# Patient Record
Sex: FEMALE | Race: OTHER | Employment: STUDENT | URBAN - METROPOLITAN AREA
[De-identification: names, ages, dates, MRNs, and addresses within clinical notes are randomized per-mention and may not be internally consistent; named-entity substitution may affect disease eponyms.]

---
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
## 2019-04-20 ENCOUNTER — TELEPHONE (OUTPATIENT)
Dept: PEDIATRICS CLINIC | Facility: CLINIC | Age: 19
End: 2019-04-20

## 2019-04-20 NOTE — TELEPHONE ENCOUNTER
Patient is 18 and no BETY on file  Patient needs to call to request forms  [facility]

## 2019-04-27 ENCOUNTER — TELEPHONE (OUTPATIENT)
Dept: PEDIATRICS CLINIC | Facility: CLINIC | Age: 19
End: 2019-04-27

## 2019-04-27 NOTE — TELEPHONE ENCOUNTER
Forms placed on [facility] desk at [facility] for review and completion. Last [address] [place] [address] 8/20/18. Immunization section asking if patient up to date- Health maint says needs 2nd varivax. 1st was given prior to first birthday. Need nurse visit?

## 2019-04-27 NOTE — TELEPHONE ENCOUNTER
PT DROPPED OFF FORMS TO BE FILLED OUT FOR A SUMMER JOB, FORMS ARE IN GREEN BIN AT . PLS CALL PT WHEN ALL DONE AND READY TO BE PICKED UP.

## 2019-04-29 NOTE — TELEPHONE ENCOUNTER
Attempted to call mom, no answer and unable to leave voicemail  Form at nurse station at [facility]

## 2019-04-30 NOTE — TELEPHONE ENCOUNTER
Spoke with mom, aware paperwork is ready and TB is still incomplete,as well as need for varicella vaccine. Mom states she will  forms and meera [facility] will complete TB and chickenpox vaccine at [facility].

## 2019-05-06 ENCOUNTER — TELEPHONE (OUTPATIENT)
Dept: PEDIATRICS CLINIC | Facility: CLINIC | Age: 19
End: 2019-05-06

## 2019-05-06 NOTE — TELEPHONE ENCOUNTER
Patient needs TB test and Varicella vaccine   Had 1st varicella before her 1st birthday    Nurse visit scheduled for 5/25 for TB placement and Varicella  TB read scheduled for 5/28    Orders pended and routed to [facility]

## 2019-05-25 ENCOUNTER — NURSE ONLY (OUTPATIENT)
Dept: PEDIATRICS CLINIC | Facility: CLINIC | Age: 19
End: 2019-05-25
Payer: COMMERCIAL

## 2019-05-25 DIAGNOSIS — Z23 NEED FOR VACCINATION: Primary | ICD-10-CM

## 2019-05-25 PROCEDURE — 90716 VAR VACCINE LIVE SUBQ: CPT | Performed by: PEDIATRICS

## 2019-05-25 PROCEDURE — 86580 TB INTRADERMAL TEST: CPT | Performed by: PEDIATRICS

## 2019-05-25 PROCEDURE — 90471 IMMUNIZATION ADMIN: CPT | Performed by: PEDIATRICS

## 2019-05-25 NOTE — PROGRESS NOTES
Pt here to day for varicella and tb test per . Last wcc 8/20/18 with . TB placed on L fore arm pt instructed to return to office 5/28/19 before 12pm to have tb read.

## 2019-05-28 ENCOUNTER — NURSE ONLY (OUTPATIENT)
Dept: PEDIATRICS CLINIC | Facility: CLINIC | Age: 19
End: 2019-05-28

## 2019-05-28 DIAGNOSIS — Z11.1 SCREENING FOR TUBERCULOSIS: Primary | ICD-10-CM

## 2019-05-28 NOTE — PROGRESS NOTES
Patient came in today for nurse visit to have TB test read  Reading negative  Immunization record and result letter given

## 2019-06-22 NOTE — PATIENT INSTRUCTIONS
Healthy Active Living  An initiative of the American Academy of Pediatrics    Fact Sheet: Healthy Active Living for Families    Healthy nutrition starts as early as infancy with breastfeeding.  Once your baby begins eating solid foods, introduce nutritiou Stay involved in your teen’s life. Make sure your teen knows you’re always there when he or she needs to talk. During the teen years, it’s important to keep having yearly checkups. Your teen may be embarrassed about having a checkup.  Reassure your teen t · Body changes. The body grows and matures during puberty. Hair will grow in the pubic area and on other parts of the body. Girls grow breasts and menstruate (have monthly periods). A boy’s voice changes, becoming lower and deeper.  As the penis matures, er · Eat healthy. Your child should eat fruits, vegetables, lean meats, and whole grains every day. Less healthy foods—like french fries, candy, and chips—should be eaten rarely.  Some teens fall into the trap of snacking on junk food and fast food throughout · Encourage your teen to keep a consistent bedtime, even on weekends. Sleeping is easier when the body follows a routine. Don’t let your teen stay up too late at night or sleep in too long in the morning. · Help your teen wake up, if needed.  Go into the b · Set rules and limits around driving and use of the car. If your teen gets a ticket or has an accident, there should be consequences. Driving is a privilege that can be taken away if your child doesn’t follow the rules.   · Teach your child to make good de © 5610-9116 [address]. 1407 [facility], [employer] [place] [employer]. All rights reserved. This information is not intended as a substitute for professional medical care. Always follow your healthcare professional's instructions. [employer], [facility]

## 2020-12-07 NOTE — H&P
Kathia Carvajal is a 23year old female. HPI:   Patient presents with:  Checkup: pt here for school check up due to studying abroad     Ms. Chaparro is a 23year old female coming in for annual physical examination and for study abroad in February to [place] Allergies:    Peanuts                 ANAPHYLAXIS  Shellfish-Derived P*    UNKNOWN      ROS:   Positive Findings indicated in BOLD    Constitutional: Fever, Chills, Weight Gain, Weight Loss, Night Sweats, Fatigue, Malaise  ENT/Mouth:  Hearing Changes rebound tenderness.  No hepatomegaly, No splenomegaly  Genitourinary: No CVA tenderness bilaterally  Neurologic: No focal neurological deficits, CN II-XII intact, light touch intact, MSK Strength 5/5 and symmetric in all extremities, normal gait  Musculoske Rubella, IGG [E]      Mumps virus AB, IGG & IGM [E]      Rubeola(Measles)Antibodies, IGG-Immunity      Rubeola(Measles)Antibodies,IGM      Meds This Visit:  Requested Prescriptions      No prescriptions requested or ordered in this encounter       Imaging

## 2020-12-07 NOTE — PATIENT INSTRUCTIONS
- You were seen in clinic for regular annual check-up. We have ordered labs for you and we will call you with the results. We will update your study abroad forms as the results come in.   We will call you when it is ready for pickup to continue processing

## 2020-12-09 ENCOUNTER — LAB ENCOUNTER (OUTPATIENT)
Dept: LAB | Age: 20
End: 2020-12-09
Attending: INTERNAL MEDICINE
Payer: COMMERCIAL

## 2020-12-09 ENCOUNTER — OFFICE VISIT (OUTPATIENT)
Dept: INTERNAL MEDICINE CLINIC | Facility: CLINIC | Age: 20
End: 2020-12-09
Payer: COMMERCIAL

## 2020-12-09 VITALS
DIASTOLIC BLOOD PRESSURE: 70 MMHG | TEMPERATURE: 98 F | WEIGHT: 137.38 LBS | OXYGEN SATURATION: 98 % | HEIGHT: 62.5 IN | HEART RATE: 99 BPM | SYSTOLIC BLOOD PRESSURE: 126 MMHG | BODY MASS INDEX: 24.65 KG/M2

## 2020-12-09 DIAGNOSIS — Z01.419 WELL WOMAN EXAM WITH ROUTINE GYNECOLOGICAL EXAM: ICD-10-CM

## 2020-12-09 DIAGNOSIS — Z01.84 IMMUNITY TO MUMPS DETERMINED BY SEROLOGIC TEST: ICD-10-CM

## 2020-12-09 DIAGNOSIS — Z01.84 IMMUNITY TO MEASLES, MUMPS, AND RUBELLA DETERMINED BY SEROLOGIC TEST: ICD-10-CM

## 2020-12-09 DIAGNOSIS — Z01.84 IMMUNITY TO MUMPS DETERMINED BY SEROLOGIC TEST: Primary | ICD-10-CM

## 2020-12-09 DIAGNOSIS — Z11.3 SCREENING FOR STD (SEXUALLY TRANSMITTED DISEASE): ICD-10-CM

## 2020-12-09 DIAGNOSIS — Z11.1 SCREENING FOR TUBERCULOSIS: ICD-10-CM

## 2020-12-09 PROCEDURE — 86735 MUMPS ANTIBODY: CPT

## 2020-12-09 PROCEDURE — 3008F BODY MASS INDEX DOCD: CPT | Performed by: INTERNAL MEDICINE

## 2020-12-09 PROCEDURE — 86762 RUBELLA ANTIBODY: CPT | Performed by: INTERNAL MEDICINE

## 2020-12-09 PROCEDURE — 86780 TREPONEMA PALLIDUM: CPT | Performed by: INTERNAL MEDICINE

## 2020-12-09 PROCEDURE — 99385 PREV VISIT NEW AGE 18-39: CPT | Performed by: INTERNAL MEDICINE

## 2020-12-09 PROCEDURE — 86765 RUBEOLA ANTIBODY: CPT | Performed by: INTERNAL MEDICINE

## 2020-12-09 PROCEDURE — 3074F SYST BP LT 130 MM HG: CPT | Performed by: INTERNAL MEDICINE

## 2020-12-09 PROCEDURE — 36415 COLL VENOUS BLD VENIPUNCTURE: CPT

## 2020-12-09 PROCEDURE — 86480 TB TEST CELL IMMUN MEASURE: CPT | Performed by: INTERNAL MEDICINE

## 2020-12-09 PROCEDURE — 3078F DIAST BP <80 MM HG: CPT | Performed by: INTERNAL MEDICINE

## 2020-12-14 ENCOUNTER — TELEPHONE (OUTPATIENT)
Dept: INTERNAL MEDICINE CLINIC | Facility: CLINIC | Age: 20
End: 2020-12-14

## 2020-12-14 NOTE — TELEPHONE ENCOUNTER
LMTCB. Paperwork from MD outbox left in labeled envelope at [facility] for pickup. Copy sent to scanning.

## 2020-12-14 NOTE — TELEPHONE ENCOUNTER
Please kindly notify the patient that I reviewed her blood results from 12/9/2020: (Sorry for lengthy message)    Syphilis screen: Negative  Rubella antibodies: She is immune to rubella with sufficient antibodies  Rubeola/measles: She is immune to measles

## 2020-12-15 ENCOUNTER — TELEPHONE (OUTPATIENT)
Dept: INTERNAL MEDICINE CLINIC | Facility: CLINIC | Age: 20
End: 2020-12-15

## 2020-12-15 DIAGNOSIS — Z91.018 FOOD ALLERGY: ICD-10-CM

## 2020-12-15 DIAGNOSIS — T78.2XXS ANAPHYLAXIS, SEQUELA: ICD-10-CM

## 2020-12-15 NOTE — TELEPHONE ENCOUNTER
Please kindly notify the patient that all of her blood work has resulted:    Rubella, mumps, measles IgM levels were negative. These are not required for the paperwork she picked up.   Undetectable IgM levels means that she is not actively having these dis

## 2020-12-18 ENCOUNTER — TELEPHONE (OUTPATIENT)
Dept: INTERNAL MEDICINE CLINIC | Facility: CLINIC | Age: 20
End: 2020-12-18

## 2020-12-18 DIAGNOSIS — Z20.822 EXPOSURE TO COVID-19 VIRUS: Primary | ICD-10-CM

## 2020-12-18 NOTE — TELEPHONE ENCOUNTER
Two options:  1) pt may seek COVID test at another facility   2) pt may call here on Jan 20-22, and we can pursue appropriate COVID testing at that time    Please call pt

## 2020-12-18 NOTE — TELEPHONE ENCOUNTER
Patient calling for Covid test order. Needs test done in January. Results must be between 1/26-1/28/2021. Will be traveling abroad for school.      Best call back number [phone]

## 2020-12-18 NOTE — TELEPHONE ENCOUNTER
To Dr. Abdirahman Farrar to please advise in PCP absence (FYI Dr. Luis Shoemaker)---  [facility] COVID test ordered per MD protocol for return to school precautions--patient notified and reports that she will be traveling abroad to [facility] [place] who requires a COVID RT-PCR test. [facility] COVID test ca

## 2020-12-23 ENCOUNTER — OFFICE VISIT (OUTPATIENT)
Dept: OBGYN CLINIC | Facility: CLINIC | Age: 20
End: 2020-12-23
Payer: COMMERCIAL

## 2020-12-23 VITALS
BODY MASS INDEX: 25.47 KG/M2 | SYSTOLIC BLOOD PRESSURE: 110 MMHG | DIASTOLIC BLOOD PRESSURE: 78 MMHG | WEIGHT: 138.38 LBS | HEIGHT: 62 IN

## 2020-12-23 DIAGNOSIS — Z01.419 ENCOUNTER FOR GYNECOLOGICAL EXAMINATION WITHOUT ABNORMAL FINDING: Primary | ICD-10-CM

## 2020-12-23 PROCEDURE — 99385 PREV VISIT NEW AGE 18-39: CPT | Performed by: OBSTETRICS & GYNECOLOGY

## 2020-12-23 PROCEDURE — 3078F DIAST BP <80 MM HG: CPT | Performed by: OBSTETRICS & GYNECOLOGY

## 2020-12-23 PROCEDURE — 3074F SYST BP LT 130 MM HG: CPT | Performed by: OBSTETRICS & GYNECOLOGY

## 2020-12-23 PROCEDURE — 3008F BODY MASS INDEX DOCD: CPT | Performed by: OBSTETRICS & GYNECOLOGY

## 2020-12-23 NOTE — PROGRESS NOTES
GYN H&P     2020  9:02 AM    CC: Patient is here for annual. No previous pap    HPI: Patient is a 23year old  for above. Not sexually active ever.      Menses: once a month, some cramps relieved with ibuprofen  Pelvic Pain: None  Vaginal disch Sexual activity: Never        Partners: Male    Social History    Social History Narrative      Live with parents      No h/o abuse      Feels safe. Medications reviewed.  See active list.     /78   Ht 62\"   Wt 138 lb 6.4 oz (62.8 kg)   LMP 12/

## 2021-01-04 RX ORDER — EPINEPHRINE 0.3 MG/.3ML
INJECTION SUBCUTANEOUS
Qty: 1 EACH | Refills: 1 | Status: SHIPPED | OUTPATIENT
Start: 2021-01-04

## 2021-01-04 NOTE — TELEPHONE ENCOUNTER
Called patient and relayed DR. BERMUDEZ message - verbalized understanding.  RX for epi- pen sent to pharmacy

## 2021-08-06 ENCOUNTER — TELEPHONE (OUTPATIENT)
Dept: OBGYN CLINIC | Facility: CLINIC | Age: 21
End: 2021-08-06

## 2021-08-06 ENCOUNTER — OFFICE VISIT (OUTPATIENT)
Dept: OBGYN CLINIC | Facility: CLINIC | Age: 21
End: 2021-08-06
Payer: COMMERCIAL

## 2021-08-06 VITALS
SYSTOLIC BLOOD PRESSURE: 110 MMHG | HEIGHT: 62 IN | WEIGHT: 139 LBS | DIASTOLIC BLOOD PRESSURE: 72 MMHG | BODY MASS INDEX: 25.58 KG/M2

## 2021-08-06 DIAGNOSIS — N94.6 DYSMENORRHEA: Primary | ICD-10-CM

## 2021-08-06 PROCEDURE — 3008F BODY MASS INDEX DOCD: CPT | Performed by: OBSTETRICS & GYNECOLOGY

## 2021-08-06 PROCEDURE — 3074F SYST BP LT 130 MM HG: CPT | Performed by: OBSTETRICS & GYNECOLOGY

## 2021-08-06 PROCEDURE — 3078F DIAST BP <80 MM HG: CPT | Performed by: OBSTETRICS & GYNECOLOGY

## 2021-08-06 PROCEDURE — 99213 OFFICE O/P EST LOW 20 MIN: CPT | Performed by: OBSTETRICS & GYNECOLOGY

## 2021-08-06 RX ORDER — LEVONORGESTREL AND ETHINYL ESTRADIOL 0.1-0.02MG
1 KIT ORAL DAILY
Qty: 84 TABLET | Refills: 4 | Status: SHIPPED | OUTPATIENT
Start: 2021-08-06 | End: 2021-12-20

## 2021-08-06 RX ORDER — ALBUTEROL SULFATE 90 UG/1
2 AEROSOL, METERED RESPIRATORY (INHALATION)
COMMUNITY
Start: 2021-01-19

## 2021-08-06 NOTE — TELEPHONE ENCOUNTER
Called pt and scheduled to see [address] [facility] today. Pt verbalized understanding.         Last visit 12/23/20 [facility]

## 2021-08-06 NOTE — PROGRESS NOTES
GYN H&P     8/6/2021  2:42 PM    CC: Patient is here for severe menstrual cramps. HPI: Patient is a 23year old [place] presents with worsening LA menstrual cramps which were severe today. She tried aleve which helped after a long time. This has happene tobacco: Never Used    Vaping Use      Vaping Use: Never used    Substance and Sexual Activity      Alcohol use: Never        Alcohol/week: 0.0 standard drinks      Drug use: Never      Sexual activity: Never        Partners: Male    Social History    Soci

## 2021-08-06 NOTE — TELEPHONE ENCOUNTER
PT has bad cramps   Took 2 Aleve for pain  She is vomiting and may have thrown them up  PT is calling for help with pain

## 2021-12-19 NOTE — PATIENT INSTRUCTIONS
- You were seen in clinic for regular annual check-up. We have ordered labs for you and we will call you with the results. Please obtain blood work fasting for at least 12 hours, okay to drink water the day of your blood draw.   If you prefer, it is okay t

## 2021-12-19 NOTE — H&P
Franklyn Jefferson is a 24year old female. HPI:   Patient presents with:  Physical: Annual Px    Ms. Chaparro is a 24year old female coming in for annual physical examination. R ankle pain - has been icing it. Onset was < 1 week.  Has been about the same Neg    • Ovarian Cancer Neg    • Colon Cancer Neg       Social History: Social History    Tobacco Use      Smoking status: Never Smoker      Smokeless tobacco: Never Used    Vaping Use      Vaping Use: Never used    Alcohol use: Never      Alcohol/week: 0. Anxiety, Depression, Insomnia, Suicidal Ideation, Homicidal ideation, Memory Changes  Heme/Lymph: Bruising, Bleeding, Lymphadenopathy  Endocrine: Polyuria, Polydipsia, Temperature Intolerance    PHYSICAL EXAM:   Vital Signs:  Blood pressure 130/76, pulse 1 examination    Right ankle sprain  Possible inciting event related to her physical activity/dance. Pain localized to the dorsum of the right foot.   Benign examination  –Recommended RICE therapy: Resting, icing, compression of the area, elevating the legs 07/19/2021   • DTAP 01/07/2000, 03/09/2000, 05/04/2000, 05/05/2001, 10/30/2004   • FLULAVAL 6 months & older 0.5 ml Prefilled syringe (60941) 10/05/2020   • FLUZONE 6 months and older PFS 0.5 ml (41947) 10/13/2014, 10/05/2020   • HEP A 08/22/2011, 10/13/20

## 2021-12-20 ENCOUNTER — OFFICE VISIT (OUTPATIENT)
Dept: INTERNAL MEDICINE CLINIC | Facility: CLINIC | Age: 21
End: 2021-12-20
Payer: COMMERCIAL

## 2021-12-20 VITALS
OXYGEN SATURATION: 98 % | HEIGHT: 62 IN | SYSTOLIC BLOOD PRESSURE: 130 MMHG | TEMPERATURE: 99 F | WEIGHT: 134.19 LBS | BODY MASS INDEX: 24.69 KG/M2 | HEART RATE: 120 BPM | DIASTOLIC BLOOD PRESSURE: 76 MMHG

## 2021-12-20 DIAGNOSIS — N94.6 DYSMENORRHEA: ICD-10-CM

## 2021-12-20 DIAGNOSIS — F41.1 GENERALIZED ANXIETY DISORDER: ICD-10-CM

## 2021-12-20 DIAGNOSIS — Z13.29 SCREENING FOR THYROID DISORDER: ICD-10-CM

## 2021-12-20 DIAGNOSIS — Z91.018 FOOD ALLERGY: ICD-10-CM

## 2021-12-20 DIAGNOSIS — Z00.00 ROUTINE GENERAL MEDICAL EXAMINATION AT A HEALTH CARE FACILITY: Primary | ICD-10-CM

## 2021-12-20 DIAGNOSIS — Z13.220 SCREENING FOR LIPOID DISORDERS: ICD-10-CM

## 2021-12-20 DIAGNOSIS — Z13.0 SCREENING FOR IRON DEFICIENCY ANEMIA: ICD-10-CM

## 2021-12-20 DIAGNOSIS — E55.9 VITAMIN D DEFICIENCY: ICD-10-CM

## 2021-12-20 PROCEDURE — 3078F DIAST BP <80 MM HG: CPT | Performed by: INTERNAL MEDICINE

## 2021-12-20 PROCEDURE — 3075F SYST BP GE 130 - 139MM HG: CPT | Performed by: INTERNAL MEDICINE

## 2021-12-20 PROCEDURE — 3008F BODY MASS INDEX DOCD: CPT | Performed by: INTERNAL MEDICINE

## 2021-12-20 PROCEDURE — 99395 PREV VISIT EST AGE 18-39: CPT | Performed by: INTERNAL MEDICINE

## 2022-06-01 ENCOUNTER — OFFICE VISIT (OUTPATIENT)
Dept: INTERNAL MEDICINE CLINIC | Facility: CLINIC | Age: 22
End: 2022-06-01
Payer: COMMERCIAL

## 2022-06-01 VITALS
BODY MASS INDEX: 24.36 KG/M2 | HEIGHT: 62 IN | SYSTOLIC BLOOD PRESSURE: 99 MMHG | HEART RATE: 92 BPM | WEIGHT: 132.38 LBS | TEMPERATURE: 98 F | DIASTOLIC BLOOD PRESSURE: 63 MMHG | OXYGEN SATURATION: 98 %

## 2022-06-01 DIAGNOSIS — J35.8 TONSILLAR EXUDATE: Primary | ICD-10-CM

## 2022-06-01 LAB
CONTROL LINE PRESENT WITH A CLEAR BACKGROUND (YES/NO): YES YES/NO
KIT LOT #: 2490 NUMERIC
STREP GRP A CUL-SCR: NEGATIVE

## 2022-06-01 PROCEDURE — 3078F DIAST BP <80 MM HG: CPT | Performed by: INTERNAL MEDICINE

## 2022-06-01 PROCEDURE — 87880 STREP A ASSAY W/OPTIC: CPT | Performed by: INTERNAL MEDICINE

## 2022-06-01 PROCEDURE — 99213 OFFICE O/P EST LOW 20 MIN: CPT | Performed by: INTERNAL MEDICINE

## 2022-06-01 PROCEDURE — 3074F SYST BP LT 130 MM HG: CPT | Performed by: INTERNAL MEDICINE

## 2022-06-01 PROCEDURE — 3008F BODY MASS INDEX DOCD: CPT | Performed by: INTERNAL MEDICINE

## (undated) NOTE — LETTER
VACCINE ADMINISTRATION RECORD  PARENT / GUARDIAN APPROVAL  Date: 2018  Vaccine administered to: Karis Clay     : 2000    MRN: [id number]    A copy of the appropriate Centers for Disease Control and Prevention Vaccine Information statement

## (undated) NOTE — LETTER
[address] [employer] Examination       Student's Name  Junior Kenney D verifying above immunization history must sign below.   Signature                                                                                                                                   Title                           Date     Signature Katerina Chaparro Offer Birth Date  11/1/2000  Sex  Female School   Grade Level/ID#  {DMG_GRADE:1366}   HEALTH HISTORY          TO BE COMPLETED AND SIGNED BY PARENT/GUARDIAN AND VERIFIED BY HEALTH CARE PROVIDER    ALLERGIES  (Food, drug, insect, other)  Peanuts; Bone/Joint problem/injury/scoliosis?    Yes   No  Parent/Guardian Signature                                          Date     PHYSICAL EXAMINATION REQUIREMENTS    Entire section below to be completed by MD/DO/APN/PA       PHYSICAL EXAMINATION REQUIREMENTS ( SYSTEM REVIEW Normal Comments/Follow-up/Needs  Normal Comments/Follow-up/Needs   Skin {YES:829::\"Yes\"}  Endocrine {YES:829::\"Yes\"}    Ears {YES:829::\"Yes\"}                      Screen result: Gastrointestinal {YES:829::\"Yes\"}    Eyes {YES:829::\"Ye SPORTS   {BLANK, Y/N/MODIFIED:8603::\"Yes\"}   Physician/Advanced Practice Nurse/Physician Assistant performing examination  Print Name  Nurse                                                 Signature

## (undated) NOTE — LETTER
VACCINE ADMINISTRATION RECORD  PARENT / GUARDIAN APPROVAL  Date: 2018  Vaccine administered to: Yasmin Winchester     : 2000    MRN: [id number]    A copy of the appropriate Centers for Disease Control and Prevention Vaccine Information statement

## (undated) NOTE — LETTER
VACCINE ADMINISTRATION RECORD  PARENT / GUARDIAN APPROVAL  Date: 2019  Vaccine administered to: Dirk Gonzalez     : 2000    MRN: [id number]    A copy of the appropriate Centers for Disease Control and Prevention Vaccine Information statement

## (undated) NOTE — LETTER
[facility] of [facility] [address] of Child Health Examination       Student's Name  Iliana Maori Birth Da Signature                                                                                                                                   Title                           Date     Signature Female School   Grade Level/ID#  12th Grade   HEALTH HISTORY          TO BE COMPLETED AND SIGNED BY PARENT/GUARDIAN AND VERIFIED BY HEALTH CARE PROVIDER    ALLERGIES  (Food, drug, insect, other) MEDICATION  (List all prescribed or taken on a regular basis. /79   Ht 5' 2.5\" (1.588 m)   Wt 59.9 kg (132 lb)   BMI 23.76 kg/m²     DIABETES SCREENING  BMI>85% age/sex  No And any two of the following:  Family History No   Ethnic Minority  No          Signs of Insulin Resistance (hypertension, dyslipidemia, p Currently Prescribed Asthma Medication:            Quick-relief  medication (e.g. Short Acting Beta Antagonist): No          Controller medication (e.g. inhaled corticosteroid):   No Other   NEEDS/MODIFICATIONS required in the school setting  None DIET

## (undated) NOTE — LETTER
[facility] of [facility] [address] of Child Health Examination       Student's Name  Johnnyed Sai Birth Da Signature                                                                                                                                   Title                           Date     Signature Female School   Grade Level/ID#  12th Grade   HEALTH HISTORY          TO BE COMPLETED AND SIGNED BY PARENT/GUARDIAN AND VERIFIED BY HEALTH CARE PROVIDER    ALLERGIES  (Food, drug, insect, other) MEDICATION  (List all prescribed or taken on a regular basis. /73   Ht 5' 2\" (1.575 m)   Wt 59.2 kg (130 lb 9.6 oz)   BMI 23.89 kg/m²     DIABETES SCREENING  BMI>85% age/sex  No And any two of the following:  Family History No   Ethnic Minority  No          Signs of Insulin Resistance (hypertension, dyslipidem Currently Prescribed Asthma Medication:            Quick-relief  medication (e.g. Short Acting Beta Antagonist): No          Controller medication (e.g. inhaled corticosteroid):   No Other   NEEDS/MODIFICATIONS required in the school setting  None DIET

## (undated) NOTE — LETTER
5/28/2019              Bandar DELMI Krysta        [address] [address]         To Whom it may concern:    Rian Riddle has had tuberculin purified protein derivative (PPD) tests as listed below.     Negative: 0 mm    Should you have any